# Patient Record
Sex: FEMALE | Race: WHITE | URBAN - METROPOLITAN AREA
[De-identification: names, ages, dates, MRNs, and addresses within clinical notes are randomized per-mention and may not be internally consistent; named-entity substitution may affect disease eponyms.]

---

## 2018-04-24 ENCOUNTER — FOLLOW UP ESTABLISHED (OUTPATIENT)
Dept: URBAN - METROPOLITAN AREA CLINIC 24 | Facility: CLINIC | Age: 83
End: 2018-04-24
Payer: MEDICARE

## 2018-04-24 DIAGNOSIS — H18.59 OTHER HEREDITARY CORNEAL DYSTROPHIES: ICD-10-CM

## 2018-04-24 PROCEDURE — 92025 CPTRIZED CORNEAL TOPOGRAPHY: CPT | Performed by: OPTOMETRIST

## 2018-04-24 PROCEDURE — 76514 ECHO EXAM OF EYE THICKNESS: CPT | Performed by: OPTOMETRIST

## 2018-04-24 PROCEDURE — 92133 CPTRZD OPH DX IMG PST SGM ON: CPT | Performed by: OPTOMETRIST

## 2018-04-24 PROCEDURE — 92014 COMPRE OPH EXAM EST PT 1/>: CPT | Performed by: OPTOMETRIST

## 2018-04-24 ASSESSMENT — INTRAOCULAR PRESSURE
OS: 24
OD: 26

## 2018-04-24 ASSESSMENT — VISUAL ACUITY
OS: 20/50
OD: 20/25

## 2019-01-10 ENCOUNTER — FOLLOW UP ESTABLISHED (OUTPATIENT)
Dept: URBAN - METROPOLITAN AREA CLINIC 24 | Facility: CLINIC | Age: 84
End: 2019-01-10
Payer: MEDICARE

## 2019-01-10 DIAGNOSIS — H40.013 OPEN ANGLE WITH BORDERLINE FINDINGS, LOW RISK, BILATERAL: Primary | ICD-10-CM

## 2019-01-10 PROCEDURE — 92083 EXTENDED VISUAL FIELD XM: CPT | Performed by: OPTOMETRIST

## 2019-01-10 PROCEDURE — 92014 COMPRE OPH EXAM EST PT 1/>: CPT | Performed by: OPTOMETRIST

## 2019-01-10 PROCEDURE — 92025 CPTRIZED CORNEAL TOPOGRAPHY: CPT | Performed by: OPTOMETRIST

## 2019-01-10 PROCEDURE — 92133 CPTRZD OPH DX IMG PST SGM ON: CPT | Performed by: OPTOMETRIST

## 2019-01-10 PROCEDURE — 92134 CPTRZ OPH DX IMG PST SGM RTA: CPT | Performed by: OPTOMETRIST

## 2019-01-10 ASSESSMENT — VISUAL ACUITY
OS: 20/40
OD: 20/20

## 2019-01-10 ASSESSMENT — INTRAOCULAR PRESSURE
OS: 28
OD: 28

## 2020-10-05 ENCOUNTER — FOLLOW UP ESTABLISHED (OUTPATIENT)
Dept: URBAN - METROPOLITAN AREA CLINIC 24 | Facility: CLINIC | Age: 85
End: 2020-10-05
Payer: MEDICARE

## 2020-10-05 DIAGNOSIS — H26.492 OTHER SECONDARY CATARACT, LEFT EYE: ICD-10-CM

## 2020-10-05 DIAGNOSIS — T85.22XA DISPLACEMENT OF INTRAOCULAR LENS, INITIAL ENCOUNTER: ICD-10-CM

## 2020-10-05 PROCEDURE — 92133 CPTRZD OPH DX IMG PST SGM ON: CPT | Performed by: OPTOMETRIST

## 2020-10-05 PROCEDURE — 92014 COMPRE OPH EXAM EST PT 1/>: CPT | Performed by: OPTOMETRIST

## 2020-10-05 ASSESSMENT — VISUAL ACUITY
OS: 20/50
OD: 20/30

## 2020-10-05 ASSESSMENT — KERATOMETRY
OD: 44.35
OS: 44.52

## 2021-10-12 ENCOUNTER — OFFICE VISIT (OUTPATIENT)
Dept: URBAN - METROPOLITAN AREA CLINIC 24 | Facility: CLINIC | Age: 86
End: 2021-10-12
Payer: OTHER MISCELLANEOUS

## 2021-10-12 DIAGNOSIS — H18.593 OTHER HEREDITARY CORNEAL DYSTROPHIES, BILATERAL: Primary | ICD-10-CM

## 2021-10-12 DIAGNOSIS — T85.22XD DISPLACEMENT OF INTRAOCULAR LENS, SUBSEQUENT ENCOUNTER: ICD-10-CM

## 2021-10-12 DIAGNOSIS — H52.213 IRREGULAR ASTIGMATISM, BILATERAL: ICD-10-CM

## 2021-10-12 PROCEDURE — 92134 CPTRZ OPH DX IMG PST SGM RTA: CPT | Performed by: OPTOMETRIST

## 2021-10-12 PROCEDURE — 92133 CPTRZD OPH DX IMG PST SGM ON: CPT | Performed by: OPTOMETRIST

## 2021-10-12 PROCEDURE — 99214 OFFICE O/P EST MOD 30 MIN: CPT | Performed by: OPTOMETRIST

## 2021-10-12 PROCEDURE — 92025 CPTRIZED CORNEAL TOPOGRAPHY: CPT | Performed by: OPTOMETRIST

## 2021-10-12 ASSESSMENT — INTRAOCULAR PRESSURE
OD: 20
OS: 20

## 2021-10-12 ASSESSMENT — VISUAL ACUITY: OS: 20/30

## 2021-10-12 NOTE — IMPRESSION/PLAN
Impression: Open angle with borderline findings, low risk, bilateral // OHT
-- avg pachs, fohx glc negative
-- Tmax OU:28 Plan: Updated RNFL OCT - again, normal.
Good rim tissue. IOP improved from previous Will continue w/o gtts for now. Stressed importance of continued observation.

## 2021-10-12 NOTE — IMPRESSION/PLAN
Impression: Displacement of intraocular lens, subsequent encounter: T85.22xD. -- slight nasal / superior decentration Plan: Vision overall stable. Pt edu. Would not recommend reposition at this point. Notify clinic if vision changes noted.

## 2021-10-12 NOTE — IMPRESSION/PLAN
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 Plan: Todays MRx demonstrated, prefers, released.

## 2021-10-12 NOTE — IMPRESSION/PLAN
Impression: Other secondary cataract, left eye
-- incipient Plan: Opacified capsule not affecting vision. No indication for treatment. Return if decreased vision.

## 2021-10-12 NOTE — IMPRESSION/PLAN
Impression: Other hereditary corneal dystrophies, bilateral ; OU // EBMD OU Plan: Again, recommend consistent use of AFT tid-qid OU. Updated frank today -- highly irregular cyl // visually significant Again discussed options; pt not interested in [place]

## 2022-11-01 ENCOUNTER — OFFICE VISIT (OUTPATIENT)
Dept: URBAN - METROPOLITAN AREA CLINIC 24 | Facility: CLINIC | Age: 87
End: 2022-11-01
Payer: OTHER MISCELLANEOUS

## 2022-11-01 DIAGNOSIS — H52.213 IRREGULAR ASTIGMATISM, BILATERAL: ICD-10-CM

## 2022-11-01 DIAGNOSIS — H40.013 OPEN ANGLE WITH BORDERLINE FINDINGS, LOW RISK, BILATERAL: ICD-10-CM

## 2022-11-01 DIAGNOSIS — H26.492 OTHER SECONDARY CATARACT, LEFT EYE: ICD-10-CM

## 2022-11-01 DIAGNOSIS — T85.22XD DISPLACEMENT OF INTRAOCULAR LENS, SUBSEQUENT ENCOUNTER: ICD-10-CM

## 2022-11-01 DIAGNOSIS — H18.593 OTHER HEREDITARY CORNEAL DYSTROPHIES, BILATERAL: Primary | ICD-10-CM

## 2022-11-01 DIAGNOSIS — H40.053 OCULAR HYPERTENSION, BILATERAL: ICD-10-CM

## 2022-11-01 PROCEDURE — 92133 CPTRZD OPH DX IMG PST SGM ON: CPT | Performed by: OPTOMETRIST

## 2022-11-01 PROCEDURE — 99214 OFFICE O/P EST MOD 30 MIN: CPT | Performed by: OPTOMETRIST

## 2022-11-01 PROCEDURE — 92025 CPTRIZED CORNEAL TOPOGRAPHY: CPT | Performed by: OPTOMETRIST

## 2022-11-01 ASSESSMENT — VISUAL ACUITY
OD: 20/30
OS: 20/50

## 2022-11-01 ASSESSMENT — INTRAOCULAR PRESSURE
OD: 18
OS: 17

## 2022-11-01 NOTE — IMPRESSION/PLAN
Impression: Other hereditary corneal dystrophies, bilateral ; OU // EBMD OU Plan: And again, recommend consistent use of AFT tid-qid OU. Updated frank today -- irregular cyl OD<OS Pt not interested in [place]

## 2022-11-01 NOTE — IMPRESSION/PLAN
Impression: Ocular hypertension, bilateral: H40.053.
-- Tmax 28 OU
-- Pachs OD: 570, OS: 575 Plan: Updated RNFL OCT - again, normal.
Good rim tissue. IOP improved from previous Will continue w/o gtts for now. Stressed importance of continued observation.

## 2022-11-01 NOTE — IMPRESSION/PLAN
Impression: Displacement of intraocular lens, subsequent encounter: T85.22xD. -- slight nasal / superior decentration Plan: Vision overall stable. Would not recommend reposition at this point. Notify clinic if vision changes noted. Again, pt advised. Appears stable from previous.

## 2022-11-01 NOTE — IMPRESSION/PLAN
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 Plan: Recommend updated SRx. Limitations discussed.

## 2023-12-12 ENCOUNTER — OFFICE VISIT (OUTPATIENT)
Dept: URBAN - METROPOLITAN AREA CLINIC 28 | Facility: CLINIC | Age: 88
End: 2023-12-12
Payer: COMMERCIAL

## 2023-12-12 DIAGNOSIS — H26.492 OTHER SECONDARY CATARACT, LEFT EYE: ICD-10-CM

## 2023-12-12 DIAGNOSIS — H40.053 OCULAR HYPERTENSION, BILATERAL: Primary | ICD-10-CM

## 2023-12-12 DIAGNOSIS — H18.593 OTHER HEREDITARY CORNEAL DYSTROPHIES, BILATERAL: ICD-10-CM

## 2023-12-12 DIAGNOSIS — H35.3131 BILATERAL NONEXUDATIVE AGE-RELATED MACULAR DEGENERATION, EARLY DRY STAGE: ICD-10-CM

## 2023-12-12 PROCEDURE — 92133 CPTRZD OPH DX IMG PST SGM ON: CPT | Performed by: OPTOMETRIST

## 2023-12-12 PROCEDURE — 92014 COMPRE OPH EXAM EST PT 1/>: CPT | Performed by: OPTOMETRIST

## 2023-12-12 ASSESSMENT — KERATOMETRY
OD: 44.38
OS: 46.00

## 2023-12-12 ASSESSMENT — VISUAL ACUITY
OS: 20/25
OD: 20/50

## 2023-12-12 ASSESSMENT — INTRAOCULAR PRESSURE
OS: 22
OD: 20

## 2023-12-29 ENCOUNTER — OFFICE VISIT (OUTPATIENT)
Dept: URBAN - METROPOLITAN AREA CLINIC 28 | Facility: CLINIC | Age: 88
End: 2023-12-29

## 2023-12-29 DIAGNOSIS — H52.223 REGULAR ASTIGMATISM, BILATERAL: Primary | ICD-10-CM

## 2023-12-29 PROCEDURE — 92015 DETERMINE REFRACTIVE STATE: CPT | Performed by: OPTOMETRIST

## 2023-12-29 ASSESSMENT — VISUAL ACUITY
OS: 20/40
OD: 20/50